# Patient Record
Sex: MALE | Race: WHITE | NOT HISPANIC OR LATINO
[De-identification: names, ages, dates, MRNs, and addresses within clinical notes are randomized per-mention and may not be internally consistent; named-entity substitution may affect disease eponyms.]

---

## 2021-06-13 ENCOUNTER — TRANSCRIPTION ENCOUNTER (OUTPATIENT)
Age: 66
End: 2021-06-13

## 2022-04-05 ENCOUNTER — TRANSCRIPTION ENCOUNTER (OUTPATIENT)
Age: 67
End: 2022-04-05

## 2022-11-15 ENCOUNTER — OFFICE (OUTPATIENT)
Dept: URBAN - METROPOLITAN AREA CLINIC 112 | Facility: CLINIC | Age: 67
Setting detail: OPHTHALMOLOGY
End: 2022-11-15
Payer: MEDICARE

## 2022-11-15 DIAGNOSIS — H16.222: ICD-10-CM

## 2022-11-15 DIAGNOSIS — H26.491: ICD-10-CM

## 2022-11-15 DIAGNOSIS — H16.223: ICD-10-CM

## 2022-11-15 DIAGNOSIS — H16.221: ICD-10-CM

## 2022-11-15 DIAGNOSIS — Z96.1: ICD-10-CM

## 2022-11-15 PROCEDURE — 83861 MICROFLUID ANALY TEARS: CPT | Performed by: OPHTHALMOLOGY

## 2022-11-15 PROCEDURE — 99213 OFFICE O/P EST LOW 20 MIN: CPT | Performed by: OPHTHALMOLOGY

## 2022-11-15 ASSESSMENT — REFRACTION_CURRENTRX
OS_CYLINDER: 0.00
OS_AXIS: 000
OD_VPRISM_DIRECTION: PROGS
OS_OVR_VA: 20/
OS_SPHERE: +1.00
OD_SPHERE: +1.00
OD_CYLINDER: 0.00
OD_OVR_VA: 20/
OS_VPRISM_DIRECTION: PROGS
OD_ADD: +2.00
OD_AXIS: 000
OS_ADD: +2.00

## 2022-11-15 ASSESSMENT — KERATOMETRY
OS_K2POWER_DIOPTERS: 45.00
OD_K1POWER_DIOPTERS: 44.50
OS_AXISANGLE_DEGREES: 090
OD_AXISANGLE_DEGREES: 090
OD_K2POWER_DIOPTERS: 44.50
OS_K1POWER_DIOPTERS: 44.50

## 2022-11-15 ASSESSMENT — REFRACTION_AUTOREFRACTION
OS_AXIS: 169
OD_SPHERE: 0.00
OS_SPHERE: 0.00
OD_CYLINDER: -0.50
OS_CYLINDER: -0.50
OD_AXIS: 067

## 2022-11-15 ASSESSMENT — REFRACTION_MANIFEST
OD_VA1: 20/20-2
OD_AXIS: 065
OS_VA1: 20/20
OS_VA2: 20/20
OD_SPHERE: PLANO
OS_SPHERE: PLANO
OD_AXIS: 080
OD_CYLINDER: -0.50
OD_VA1: 20/30-1
OD_VA2: 20/20
OD_SPHERE: +0.50
OD_CYLINDER: -0.75
OD_CYLINDER: -0.75
OD_VA1: 20/40
OD_SPHERE: +0.75
OD_AXIS: 073

## 2022-11-15 ASSESSMENT — AXIALLENGTH_DERIVED
OD_AL: 23.1831
OS_AL: 23.2356
OD_AL: 23.325
OD_AL: 23.0894

## 2022-11-15 ASSESSMENT — SPHEQUIV_DERIVED
OD_SPHEQUIV: 0.375
OD_SPHEQUIV: -0.25
OD_SPHEQUIV: 0.125
OS_SPHEQUIV: -0.25

## 2022-11-15 ASSESSMENT — CONFRONTATIONAL VISUAL FIELD TEST (CVF)
OS_FINDINGS: FULL
OD_FINDINGS: FULL

## 2022-11-15 ASSESSMENT — TEAR BREAK UP TIME (TBUT)
OD_TBUT: 6 SEC
OS_TBUT: 6 SEC

## 2022-11-15 ASSESSMENT — SUPERFICIAL PUNCTATE KERATITIS (SPK)
OD_SPK: 1+
OS_SPK: 1+

## 2022-11-15 ASSESSMENT — VISUAL ACUITY
OD_BCVA: 20/20
OS_BCVA: 20/30+1

## 2022-11-29 ENCOUNTER — RX ONLY (RX ONLY)
Age: 67
End: 2022-11-29

## 2022-11-29 ENCOUNTER — OFFICE (OUTPATIENT)
Dept: URBAN - METROPOLITAN AREA CLINIC 112 | Facility: CLINIC | Age: 67
Setting detail: OPHTHALMOLOGY
End: 2022-11-29
Payer: MEDICARE

## 2022-11-29 DIAGNOSIS — H26.492: ICD-10-CM

## 2022-11-29 DIAGNOSIS — H11.153: ICD-10-CM

## 2022-11-29 DIAGNOSIS — H02.132: ICD-10-CM

## 2022-11-29 DIAGNOSIS — H16.223: ICD-10-CM

## 2022-11-29 PROCEDURE — 99214 OFFICE O/P EST MOD 30 MIN: CPT | Performed by: OPHTHALMOLOGY

## 2022-11-29 ASSESSMENT — REFRACTION_AUTOREFRACTION
OD_AXIS: 072
OS_CYLINDER: -1.75
OS_AXIS: 002
OD_CYLINDER: -0.50
OS_SPHERE: -0.50
OD_SPHERE: 0.00

## 2022-11-29 ASSESSMENT — SPHEQUIV_DERIVED
OD_SPHEQUIV: -0.25
OD_SPHEQUIV: 0.375
OS_SPHEQUIV: -1.375
OD_SPHEQUIV: 0.125

## 2022-11-29 ASSESSMENT — REFRACTION_CURRENTRX
OD_ADD: +2.00
OD_VPRISM_DIRECTION: PROGS
OS_ADD: +2.00
OS_SPHERE: +1.00
OD_CYLINDER: 0.00
OD_OVR_VA: 20/
OD_SPHERE: +1.00
OS_OVR_VA: 20/
OS_VPRISM_DIRECTION: PROGS
OD_AXIS: 000
OS_AXIS: 000
OS_CYLINDER: 0.00

## 2022-11-29 ASSESSMENT — REFRACTION_MANIFEST
OD_VA1: 20/20-2
OS_VA2: 20/20
OD_SPHERE: +0.75
OD_CYLINDER: -0.75
OD_CYLINDER: -0.50
OD_AXIS: 065
OD_AXIS: 080
OD_AXIS: 073
OS_VA1: 20/20
OS_SPHERE: PLANO
OD_VA2: 20/20
OD_VA1: 20/30-1
OD_SPHERE: PLANO
OD_SPHERE: +0.50
OD_VA1: 20/40
OD_CYLINDER: -0.75

## 2022-11-29 ASSESSMENT — AXIALLENGTH_DERIVED
OD_AL: 23.0455
OD_AL: 23.1389
OS_AL: 23.3496
OD_AL: 23.2803

## 2022-11-29 ASSESSMENT — KERATOMETRY
OS_K2POWER_DIOPTERS: 46.25
OD_K2POWER_DIOPTERS: 44.75
OD_K1POWER_DIOPTERS: 44.50
OS_K1POWER_DIOPTERS: 45.00
OS_AXISANGLE_DEGREES: 096
OD_AXISANGLE_DEGREES: 119

## 2022-11-29 ASSESSMENT — VISUAL ACUITY
OD_BCVA: 20/20
OS_BCVA: 20/30-1

## 2022-11-29 ASSESSMENT — CONFRONTATIONAL VISUAL FIELD TEST (CVF)
OS_FINDINGS: FULL
OD_FINDINGS: FULL

## 2022-11-29 ASSESSMENT — TEAR BREAK UP TIME (TBUT)
OS_TBUT: 6 SEC
OD_TBUT: 6 SEC

## 2022-11-29 ASSESSMENT — LID POSITION - ECTROPION: OD_ECTROPION: RLL 1+ 2+

## 2022-11-29 ASSESSMENT — SUPERFICIAL PUNCTATE KERATITIS (SPK)
OS_SPK: 1+
OD_SPK: 1+

## 2022-12-23 ENCOUNTER — ASC (OUTPATIENT)
Dept: URBAN - METROPOLITAN AREA SURGERY 8 | Facility: SURGERY | Age: 67
Setting detail: OPHTHALMOLOGY
End: 2022-12-23
Payer: MEDICARE

## 2022-12-23 DIAGNOSIS — H26.492: ICD-10-CM

## 2022-12-23 PROBLEM — H26.491 POSTERIOR CAPSULAR OPACIFICATION; RIGHT EYE, LEFT EYE: Status: ACTIVE | Noted: 2022-12-23

## 2022-12-23 PROCEDURE — 66821 AFTER CATARACT LASER SURGERY: CPT | Performed by: OPHTHALMOLOGY

## 2022-12-23 ASSESSMENT — REFRACTION_MANIFEST
OD_CYLINDER: -0.75
OD_SPHERE: +0.75
OS_VA2: 20/20
OD_AXIS: 065
OD_VA2: 20/20
OD_VA1: 20/30-1
OD_AXIS: 080
OD_VA1: 20/20-2
OD_SPHERE: +0.50
OS_VA1: 20/20
OS_SPHERE: PLANO
OD_VA1: 20/40
OD_CYLINDER: -0.75
OD_SPHERE: PLANO
OD_CYLINDER: -0.50
OD_AXIS: 073

## 2022-12-23 ASSESSMENT — REFRACTION_AUTOREFRACTION
OD_AXIS: 072
OS_SPHERE: -0.50
OD_SPHERE: 0.00
OS_CYLINDER: -1.75
OS_AXIS: 002
OD_CYLINDER: -0.50

## 2022-12-23 ASSESSMENT — AXIALLENGTH_DERIVED
OD_AL: 23.2803
OD_AL: 23.1389
OD_AL: 23.0455
OS_AL: 23.3496

## 2022-12-23 ASSESSMENT — REFRACTION_CURRENTRX
OS_OVR_VA: 20/
OS_AXIS: 000
OD_OVR_VA: 20/
OS_VPRISM_DIRECTION: PROGS
OD_AXIS: 000
OD_SPHERE: +1.00
OS_SPHERE: +1.00
OD_ADD: +2.00
OD_VPRISM_DIRECTION: PROGS
OS_ADD: +2.00
OS_CYLINDER: 0.00
OD_CYLINDER: 0.00

## 2022-12-23 ASSESSMENT — SPHEQUIV_DERIVED
OD_SPHEQUIV: -0.25
OS_SPHEQUIV: -1.375
OD_SPHEQUIV: 0.375
OD_SPHEQUIV: 0.125

## 2022-12-23 ASSESSMENT — SUPERFICIAL PUNCTATE KERATITIS (SPK)
OD_SPK: 1+
OS_SPK: 1+

## 2022-12-23 ASSESSMENT — KERATOMETRY
OS_AXISANGLE_DEGREES: 096
OD_K1POWER_DIOPTERS: 44.50
OD_AXISANGLE_DEGREES: 119
OS_K2POWER_DIOPTERS: 46.25
OD_K2POWER_DIOPTERS: 44.75
OS_K1POWER_DIOPTERS: 45.00

## 2022-12-23 ASSESSMENT — VISUAL ACUITY
OS_BCVA: 20/30-1
OD_BCVA: 20/20

## 2022-12-23 ASSESSMENT — TEAR BREAK UP TIME (TBUT)
OD_TBUT: 6 SEC
OS_TBUT: 6 SEC

## 2023-01-12 ENCOUNTER — OFFICE (OUTPATIENT)
Dept: URBAN - METROPOLITAN AREA CLINIC 112 | Facility: CLINIC | Age: 68
Setting detail: OPHTHALMOLOGY
End: 2023-01-12
Payer: MEDICARE

## 2023-01-12 DIAGNOSIS — H26.492: ICD-10-CM

## 2023-01-12 DIAGNOSIS — H26.491: ICD-10-CM

## 2023-01-12 DIAGNOSIS — H02.132: ICD-10-CM

## 2023-01-12 DIAGNOSIS — Z96.1: ICD-10-CM

## 2023-01-12 DIAGNOSIS — H16.223: ICD-10-CM

## 2023-01-12 PROCEDURE — 99024 POSTOP FOLLOW-UP VISIT: CPT | Performed by: OPHTHALMOLOGY

## 2023-01-12 ASSESSMENT — REFRACTION_CURRENTRX
OS_SPHERE: +1.00
OS_VPRISM_DIRECTION: PROGS
OD_ADD: +2.00
OD_OVR_VA: 20/
OS_OVR_VA: 20/
OS_AXIS: 000
OS_CYLINDER: 0.00
OS_ADD: +2.00
OD_CYLINDER: 0.00
OD_VPRISM_DIRECTION: PROGS
OD_SPHERE: +1.00
OD_AXIS: 000

## 2023-01-12 ASSESSMENT — KERATOMETRY
OD_K1POWER_DIOPTERS: 44.25
OS_K2POWER_DIOPTERS: 45.50
OS_AXISANGLE_DEGREES: 095
OS_K1POWER_DIOPTERS: 44.75
OD_K2POWER_DIOPTERS: 44.75
OD_AXISANGLE_DEGREES: 144

## 2023-01-12 ASSESSMENT — REFRACTION_MANIFEST
OD_VA2: 20/20
OS_VA2: 20/20
OS_VA1: 20/20
OD_SPHERE: PLANO
OD_SPHERE: +0.50
OD_CYLINDER: -0.75
OD_AXIS: 080
OD_AXIS: 75
OD_VA1: 20/20-2
OD_CYLINDER: -0.50
OD_SPHERE: +0.75
OD_AXIS: 065
OS_SPHERE: PLANO
OD_CYLINDER: -0.75
OD_VA1: 20/30-1
OD_SPHERE: PLANO
OD_VA1: 20/40+
OD_CYLINDER: -0.75
OD_VA1: 20/40
OD_AXIS: 073

## 2023-01-12 ASSESSMENT — AXIALLENGTH_DERIVED
OD_AL: 23.0894
OS_AL: 23.1496
OD_AL: 23.2775
OD_AL: 23.1831

## 2023-01-12 ASSESSMENT — SPHEQUIV_DERIVED
OD_SPHEQUIV: 0.375
OS_SPHEQUIV: -0.375
OD_SPHEQUIV: 0.125
OD_SPHEQUIV: -0.125

## 2023-01-12 ASSESSMENT — REFRACTION_AUTOREFRACTION
OD_CYLINDER: -0.75
OS_AXIS: 178
OD_SPHERE: +0.25
OD_AXIS: 079
OS_SPHERE: 0.00
OS_CYLINDER: -0.75

## 2023-01-12 ASSESSMENT — CONFRONTATIONAL VISUAL FIELD TEST (CVF)
OD_FINDINGS: FULL
OS_FINDINGS: FULL

## 2023-01-12 ASSESSMENT — VISUAL ACUITY
OS_BCVA: 20/40+1
OD_BCVA: 20/20

## 2023-01-12 ASSESSMENT — LID POSITION - ECTROPION: OD_ECTROPION: RLL 1+ 2+

## 2023-01-12 ASSESSMENT — TEAR BREAK UP TIME (TBUT)
OD_TBUT: 6 SEC
OS_TBUT: 6 SEC

## 2023-01-12 ASSESSMENT — SUPERFICIAL PUNCTATE KERATITIS (SPK)
OD_SPK: 1+
OS_SPK: 1+

## 2023-02-05 ENCOUNTER — NON-APPOINTMENT (OUTPATIENT)
Age: 68
End: 2023-02-05

## 2023-02-28 ENCOUNTER — OFFICE (OUTPATIENT)
Dept: URBAN - METROPOLITAN AREA CLINIC 114 | Facility: CLINIC | Age: 68
Setting detail: OPHTHALMOLOGY
End: 2023-02-28
Payer: MEDICARE

## 2023-02-28 DIAGNOSIS — H02.135: ICD-10-CM

## 2023-02-28 DIAGNOSIS — H02.132: ICD-10-CM

## 2023-02-28 DIAGNOSIS — H43.393: ICD-10-CM

## 2023-02-28 DIAGNOSIS — H52.03: ICD-10-CM

## 2023-02-28 DIAGNOSIS — H16.223: ICD-10-CM

## 2023-02-28 PROBLEM — H16.222 DRY EYE SYNDROME K SICCA; RIGHT EYE, LEFT EYE, BOTH EYES: Status: ACTIVE | Noted: 2023-02-28

## 2023-02-28 PROBLEM — H16.221 DRY EYE SYNDROME K SICCA; RIGHT EYE, LEFT EYE, BOTH EYES: Status: ACTIVE | Noted: 2023-02-28

## 2023-02-28 PROCEDURE — 92015 DETERMINE REFRACTIVE STATE: CPT | Performed by: OPHTHALMOLOGY

## 2023-02-28 PROCEDURE — 99024 POSTOP FOLLOW-UP VISIT: CPT | Performed by: OPHTHALMOLOGY

## 2023-02-28 ASSESSMENT — REFRACTION_AUTOREFRACTION
OS_AXIS: 099
OD_SPHERE: 0.00
OS_SPHERE: +0.25
OD_AXIS: 076
OD_CYLINDER: -0.75
OS_CYLINDER: -0.75

## 2023-02-28 ASSESSMENT — REFRACTION_MANIFEST
OD_AXIS: 75
OD_VA1: 20/40
OU_VA: 20/20
OD_CYLINDER: -0.75
OD_SPHERE: +0.75
OD_VA1: 20/20-2
OD_CYLINDER: -0.50
OS_VA2: 20/20
OD_VA1: 20/30
OS_SPHERE: +0.25
OD_AXIS: 073
OS_CYLINDER: -0.50
OD_AXIS: 075
OS_VA1: 20/20
OD_AXIS: 065
OS_AXIS: 100
OD_VA1: 20/30-1
OD_SPHERE: PLANO
OS_SPHERE: PLANO
OD_VA2: 20/20
OD_SPHERE: PLANO
OD_AXIS: 080
OS_VA1: 20/20
OD_SPHERE: +0.50
OD_VA1: 20/40+
OD_SPHERE: PLANO
OD_CYLINDER: -0.75

## 2023-02-28 ASSESSMENT — SPHEQUIV_DERIVED
OD_SPHEQUIV: -0.375
OS_SPHEQUIV: 0
OD_SPHEQUIV: 0.375
OS_SPHEQUIV: -0.125
OD_SPHEQUIV: 0.125

## 2023-02-28 ASSESSMENT — VISUAL ACUITY
OD_BCVA: 20/20
OS_BCVA: 20/30-1

## 2023-02-28 ASSESSMENT — REFRACTION_CURRENTRX
OD_OVR_VA: 20/
OD_SPHERE: +1.00
OD_AXIS: 000
OS_CYLINDER: 0.00
OS_OVR_VA: 20/
OS_SPHERE: +1.00
OD_CYLINDER: 0.00
OD_ADD: +2.00
OS_AXIS: 000
OS_VPRISM_DIRECTION: PROGS
OD_VPRISM_DIRECTION: PROGS
OS_ADD: +2.00

## 2023-02-28 ASSESSMENT — TEAR BREAK UP TIME (TBUT)
OD_TBUT: 6 SEC
OS_TBUT: 6 SEC

## 2023-02-28 ASSESSMENT — SUPERFICIAL PUNCTATE KERATITIS (SPK)
OS_SPK: 1+
OD_SPK: 1+

## 2023-02-28 ASSESSMENT — LID POSITION - ECTROPION
OS_ECTROPION: LLL 1+
OD_ECTROPION: RLL 1+ 2+

## 2023-02-28 ASSESSMENT — CONFRONTATIONAL VISUAL FIELD TEST (CVF)
OD_FINDINGS: FULL
OS_FINDINGS: FULL

## 2023-03-10 ENCOUNTER — APPOINTMENT (OUTPATIENT)
Dept: ORTHOPEDIC SURGERY | Facility: CLINIC | Age: 68
End: 2023-03-10
Payer: MEDICARE

## 2023-03-10 DIAGNOSIS — Z78.9 OTHER SPECIFIED HEALTH STATUS: ICD-10-CM

## 2023-03-10 DIAGNOSIS — Z86.39 PERSONAL HISTORY OF OTHER ENDOCRINE, NUTRITIONAL AND METABOLIC DISEASE: ICD-10-CM

## 2023-03-10 DIAGNOSIS — M19.012 PRIMARY OSTEOARTHRITIS, LEFT SHOULDER: ICD-10-CM

## 2023-03-10 DIAGNOSIS — M75.22 BICIPITAL TENDINITIS, LEFT SHOULDER: ICD-10-CM

## 2023-03-10 DIAGNOSIS — E78.49 OTHER HYPERLIPIDEMIA: ICD-10-CM

## 2023-03-10 PROBLEM — Z00.00 ENCOUNTER FOR PREVENTIVE HEALTH EXAMINATION: Status: ACTIVE | Noted: 2023-03-10

## 2023-03-10 PROCEDURE — 99204 OFFICE O/P NEW MOD 45 MIN: CPT

## 2023-03-10 PROCEDURE — 73030 X-RAY EXAM OF SHOULDER: CPT | Mod: LT

## 2023-03-10 RX ORDER — LEVOTHYROXINE SODIUM 150 UG/1
150 TABLET ORAL
Refills: 0 | Status: ACTIVE | COMMUNITY

## 2023-03-10 RX ORDER — EZETIMIBE 10 MG/1
10 TABLET ORAL
Refills: 0 | Status: ACTIVE | COMMUNITY

## 2023-03-10 RX ORDER — PRAVASTATIN SODIUM 10 MG/1
10 TABLET ORAL
Refills: 0 | Status: ACTIVE | COMMUNITY

## 2023-03-10 RX ORDER — MELOXICAM 15 MG/1
15 TABLET ORAL
Qty: 90 | Refills: 0 | Status: ACTIVE | COMMUNITY
Start: 2023-03-10 | End: 1900-01-01

## 2023-03-10 NOTE — IMAGING
[de-identified] : (Shoulder Examination)\par \par Laterality\par -Left\par \par General\par -In no acute distress: yes\par -Alert and oriented to person, place and time: yes\par -Lymphadenopathy: no\par -Peripheral edema: no\par -Sensation grossly intact to light touch: yes\par -Capillary refill less than 2 seconds: yes \par \par Inspection\par -Skin intact: yes\par -Swelling: no\par -Atrophy: no\par -Scapular winging: no\par -AC deformity: no\par -Biceps deformity: no\par \par Tenderness to Palpation\par -Bicipital groove: yes\par -AC joint: no\par -Scapulothoracic: no\par -Cervical paraspinal: no\par \par Range of Motion\par -Active forward elevation: 170\par -Passive forward elevation: 170\par -External rotation at the side: 60\par -Internal rotation behind the back: T12\par -Cervical spine: normal\par \par Strength \par -Forward elevation: 5\par -External rotation at the side: 5\par -Internal rotation at the side: 5\par -Deltoid: 5\par \par Special Tests\par -Monterroso: positive \par -O’Aristeo’s: positive \par -Speed’s: positive \par -Cross body adduction: negative\par -Apprehension and relocation: negative\par -Sulcus sign: negative\par -Belly press: negative\par -Spurling’s: negative\par \par  [Left] : left shoulder [FreeTextEntry1] : mild GH OA with inferior humeral head osteophyte, central wear pattern, type 2 acromion

## 2023-03-10 NOTE — DISCUSSION/SUMMARY
[de-identified] : (Assessment and Plan)\par \par History, clinical examination and imaging were most consistent with:\par -left mild glenohumeral osteoarthritis and proximal biceps tendonitis\par \par The diagnosis was explained in detail. The potential non-surgical and surgical treatments were reviewed. The relative risks and benefits of each option were considered relative to the patient’s age, activity level, medical history, symptom severity and previously attempted treatments. \par \par -Non-surgical treatment was selected. \par -The added clinical utility of an MRI was discussed. The patient deferred further diagnostic testing at this time to pursue non-surgical treatment.\par -Patient will proceed with formal PT.\par -Meloxicam will be prescribed for symptom relief. GI precautions discussed. \par -Follow up as needed if symptoms persist or fail to improve. \par \par \par (MDM) \par \par Problem Complexity\par -Moderate: chronic illness with exacerbation\par \par Risk\par -Moderate: prescription medication\par \par -Patient has not been seen by another provider in my practice within the past 2 years who specializes in orthopedic sports medicine, shoulder or elbow surgery. \par \par The patient was advised to consult with their primary medical provider prior to initiation of any new medications to reduce the risk of adverse effects specific to their long-term home medications and medical history. The risk of gastrointestinal irritation and kidney injury specific to long-term NSAID use was discussed.   \par \par

## 2023-03-10 NOTE — HISTORY OF PRESENT ILLNESS
[de-identified] : (New Visit)\par \par Patient Details\par -Age: 67\par -Occupation: recycle management \par -Worker’s compensation claim: no\par -No-fault claim: no\par -School injury claim: no\par \par \par Symptom Details \par -Body part: LT shoulder \par -Duration of symptoms: 1 month \par -How symptoms began: NKI. history of SLAP repair in the early 2000s, no RCR. worse at night. very active, plays golf and works out\par -Location of pain: anterolateral\par -Quality of pain: dull\par -Pain score at rest: 4/10\par -Pain score with activity: 4/10\par -Swelling: no\par -Stiffness: no\par -Radicular symptoms (numbness or radiating pain down extremity): no\par \par \par Treatment Details\par -Activity modification: no\par -Medication: advil prn\par -Physical therapy: no\par -Home exercise program: works out 4x a week\par -Injection: no\par -MRI: not recent

## 2023-04-04 ENCOUNTER — OFFICE (OUTPATIENT)
Dept: URBAN - METROPOLITAN AREA CLINIC 100 | Facility: CLINIC | Age: 68
Setting detail: OPHTHALMOLOGY
End: 2023-04-04
Payer: MEDICARE

## 2023-04-04 DIAGNOSIS — H05.20: ICD-10-CM

## 2023-04-04 DIAGNOSIS — G90.2: ICD-10-CM

## 2023-04-04 DIAGNOSIS — H02.403: ICD-10-CM

## 2023-04-04 DIAGNOSIS — H16.223: ICD-10-CM

## 2023-04-04 DIAGNOSIS — H02.532: ICD-10-CM

## 2023-04-04 DIAGNOSIS — Z96.1: ICD-10-CM

## 2023-04-04 DIAGNOSIS — H02.535: ICD-10-CM

## 2023-04-04 PROCEDURE — 92285 EXTERNAL OCULAR PHOTOGRAPHY: CPT | Performed by: OPHTHALMOLOGY

## 2023-04-04 PROCEDURE — 99213 OFFICE O/P EST LOW 20 MIN: CPT | Performed by: OPHTHALMOLOGY

## 2023-04-04 ASSESSMENT — SPHEQUIV_DERIVED
OS_SPHEQUIV: -0.125
OD_SPHEQUIV: -0.375

## 2023-04-04 ASSESSMENT — LID POSITION - PTOSIS
OD_PTOSIS: RUL 1+
OS_PTOSIS: LLL 2+

## 2023-04-04 ASSESSMENT — VISUAL ACUITY
OS_BCVA: 20/50-2
OD_BCVA: 20/25-2

## 2023-04-04 ASSESSMENT — SUPERFICIAL PUNCTATE KERATITIS (SPK)
OS_SPK: 2+
OD_SPK: 2+

## 2023-04-04 ASSESSMENT — KERATOMETRY
OS_K2POWER_DIOPTERS: 43.00
OD_AXISANGLE_DEGREES: 176
OS_AXISANGLE_DEGREES: 175
OD_K1POWER_DIOPTERS: 42.50
OS_K1POWER_DIOPTERS: 41.00
OD_K2POWER_DIOPTERS: 43.75

## 2023-04-04 ASSESSMENT — TONOMETRY: OS_IOP_MMHG: 10

## 2023-04-04 ASSESSMENT — REFRACTION_AUTOREFRACTION
OS_CYLINDER: -0.75
OD_CYLINDER: -0.75
OS_AXIS: 099
OS_SPHERE: +0.25
OD_AXIS: 076
OD_SPHERE: 0.00

## 2023-04-04 ASSESSMENT — LID POSITION - COMMENTS
OS_COMMENTS: 2MM LOWER EYELID RETRACTION, PROMINENT GLOBES, NEGATIVE VECTOR
OD_COMMENTS: 2MM LOWER EYELID RETRACTION, PROMINENT GLOBES, NEGATIVE VECTOR

## 2023-04-04 ASSESSMENT — LID POSITION - ECTROPION
OS_ECTROPION: ABSENT
OD_ECTROPION: ABSENT

## 2023-04-04 ASSESSMENT — TEAR BREAK UP TIME (TBUT)
OS_TBUT: 6 SEC
OD_TBUT: 6 SEC

## 2023-04-04 ASSESSMENT — CONFRONTATIONAL VISUAL FIELD TEST (CVF)
OS_FINDINGS: FULL
OD_FINDINGS: FULL

## 2023-04-04 ASSESSMENT — AXIALLENGTH_DERIVED
OD_AL: 23.8792
OS_AL: 24.2072

## 2023-04-27 ENCOUNTER — RX ONLY (RX ONLY)
Age: 68
End: 2023-04-27

## 2023-04-27 ENCOUNTER — OFFICE (OUTPATIENT)
Dept: URBAN - METROPOLITAN AREA CLINIC 112 | Facility: CLINIC | Age: 68
Setting detail: OPHTHALMOLOGY
End: 2023-04-27
Payer: MEDICARE

## 2023-04-27 DIAGNOSIS — H11.153: ICD-10-CM

## 2023-04-27 DIAGNOSIS — H16.223: ICD-10-CM

## 2023-04-27 DIAGNOSIS — H43.393: ICD-10-CM

## 2023-04-27 PROBLEM — H02.532 EYELID RETRACTION; RIGHT LOWER LID, LEFT LOWER LID: Status: ACTIVE | Noted: 2023-04-04

## 2023-04-27 PROBLEM — H05.20 EXOPHTHALMOS UNSPECIFIED: Status: ACTIVE | Noted: 2023-04-04

## 2023-04-27 PROBLEM — H02.403 PTOSIS OF EYELID, UNSPECIFIED; BOTH EYES: Status: ACTIVE | Noted: 2023-04-04

## 2023-04-27 PROBLEM — G90.2 HORNER'S SYNDROME: Status: ACTIVE | Noted: 2023-04-04

## 2023-04-27 PROBLEM — H02.535 EYELID RETRACTION; RIGHT LOWER LID, LEFT LOWER LID: Status: ACTIVE | Noted: 2023-04-04

## 2023-04-27 PROCEDURE — 92012 INTRM OPH EXAM EST PATIENT: CPT | Performed by: OPHTHALMOLOGY

## 2023-04-27 PROCEDURE — 68761 CLOSE TEAR DUCT OPENING: CPT | Performed by: OPHTHALMOLOGY

## 2023-04-27 ASSESSMENT — REFRACTION_AUTOREFRACTION
OS_SPHERE: -0.50
OS_AXIS: 172
OS_CYLINDER: -0.75
OD_SPHERE: -0.50
OD_AXIS: 082
OD_CYLINDER: -0.25

## 2023-04-27 ASSESSMENT — LID POSITION - PTOSIS
OD_PTOSIS: RUL 1+
OS_PTOSIS: LLL 2+

## 2023-04-27 ASSESSMENT — TEAR BREAK UP TIME (TBUT)
OS_TBUT: 6 SEC
OD_TBUT: 6 SEC

## 2023-04-27 ASSESSMENT — REFRACTION_MANIFEST
OD_AXIS: 082
OS_VA1: 20/20
OD_CYLINDER: 0.00
OS_CYLINDER: 0.00
OS_SPHERE: PLANO
OD_VA1: 20/20
OD_SPHERE: -0.50
OS_AXIS: 000

## 2023-04-27 ASSESSMENT — SPHEQUIV_DERIVED
OS_SPHEQUIV: -0.875
OD_SPHEQUIV: -0.5
OD_SPHEQUIV: -0.625

## 2023-04-27 ASSESSMENT — LID POSITION - ECTROPION
OS_ECTROPION: ABSENT
OD_ECTROPION: ABSENT

## 2023-04-27 ASSESSMENT — AXIALLENGTH_DERIVED
OD_AL: 23.466
OD_AL: 23.5143
OS_AL: 23.1162

## 2023-04-27 ASSESSMENT — VISUAL ACUITY
OD_BCVA: 20/20-1
OS_BCVA: 20/40-1

## 2023-04-27 ASSESSMENT — KERATOMETRY
OD_K2POWER_DIOPTERS: 44.50
OD_AXISANGLE_DEGREES: 128
OS_K2POWER_DIOPTERS: 46.25
OD_K1POWER_DIOPTERS: 44.25
OS_AXISANGLE_DEGREES: 096
OS_K1POWER_DIOPTERS: 45.25

## 2023-04-27 ASSESSMENT — CONFRONTATIONAL VISUAL FIELD TEST (CVF)
OS_FINDINGS: FULL
OD_FINDINGS: FULL

## 2023-04-27 ASSESSMENT — SUPERFICIAL PUNCTATE KERATITIS (SPK)
OD_SPK: 2+
OS_SPK: 2+

## 2023-05-30 ENCOUNTER — OFFICE (OUTPATIENT)
Dept: URBAN - METROPOLITAN AREA CLINIC 112 | Facility: CLINIC | Age: 68
Setting detail: OPHTHALMOLOGY
End: 2023-05-30
Payer: MEDICARE

## 2023-05-30 DIAGNOSIS — H16.222: ICD-10-CM

## 2023-05-30 DIAGNOSIS — H16.223: ICD-10-CM

## 2023-05-30 DIAGNOSIS — H16.221: ICD-10-CM

## 2023-05-30 DIAGNOSIS — H43.393: ICD-10-CM

## 2023-05-30 DIAGNOSIS — H11.153: ICD-10-CM

## 2023-05-30 PROCEDURE — 83861 MICROFLUID ANALY TEARS: CPT | Performed by: OPHTHALMOLOGY

## 2023-05-30 PROCEDURE — 68761 CLOSE TEAR DUCT OPENING: CPT | Performed by: OPHTHALMOLOGY

## 2023-05-30 PROCEDURE — 99213 OFFICE O/P EST LOW 20 MIN: CPT | Performed by: OPHTHALMOLOGY

## 2023-05-30 ASSESSMENT — AXIALLENGTH_DERIVED
OD_AL: 23.5572
OS_AL: 23.1269
OD_AL: 23.3169

## 2023-05-30 ASSESSMENT — VISUAL ACUITY
OS_BCVA: 20/25
OD_BCVA: 20/20

## 2023-05-30 ASSESSMENT — REFRACTION_AUTOREFRACTION
OS_AXIS: 176
OD_SPHERE: +0.50
OS_CYLINDER: -2.75
OS_SPHERE: 0.00
OD_AXIS: 052
OD_CYLINDER: -0.75

## 2023-05-30 ASSESSMENT — CONFRONTATIONAL VISUAL FIELD TEST (CVF)
OD_FINDINGS: FULL
OS_FINDINGS: FULL

## 2023-05-30 ASSESSMENT — SPHEQUIV_DERIVED
OD_SPHEQUIV: 0.125
OS_SPHEQUIV: -1.375
OD_SPHEQUIV: -0.5

## 2023-05-30 ASSESSMENT — REFRACTION_MANIFEST
OD_VA1: 20/20
OD_SPHERE: PLANO
OS_SPHERE: PLANO
OD_AXIS: 082
OD_CYLINDER: 0.00
OS_VA1: 20/20
OS_SPHERE: PLANO
OD_SPHERE: -0.50
OD_VA1: 20/25
OS_AXIS: 000
OS_VA1: 20/20
OS_CYLINDER: 0.00

## 2023-05-30 ASSESSMENT — KERATOMETRY
OD_AXISANGLE_DEGREES: 127
OS_K2POWER_DIOPTERS: 47.75
OS_AXISANGLE_DEGREES: 089
OS_K1POWER_DIOPTERS: 44.75
OD_K2POWER_DIOPTERS: 44.50
OD_K1POWER_DIOPTERS: 43.75

## 2023-05-30 ASSESSMENT — TEAR BREAK UP TIME (TBUT)
OS_TBUT: 2+
OD_TBUT: 1+

## 2023-05-30 ASSESSMENT — SUPERFICIAL PUNCTATE KERATITIS (SPK)
OD_SPK: 2+
OS_SPK: 2+

## 2023-05-30 ASSESSMENT — LID POSITION - ECTROPION
OS_ECTROPION: ABSENT
OD_ECTROPION: ABSENT

## 2023-05-30 ASSESSMENT — TONOMETRY: OD_IOP_MMHG: 14

## 2023-05-30 ASSESSMENT — LID POSITION - PTOSIS
OD_PTOSIS: RUL 1+
OS_PTOSIS: LLL 2+

## 2023-07-18 ENCOUNTER — RX ONLY (RX ONLY)
Age: 68
End: 2023-07-18

## 2023-07-18 ENCOUNTER — OFFICE (OUTPATIENT)
Dept: URBAN - METROPOLITAN AREA CLINIC 112 | Facility: CLINIC | Age: 68
Setting detail: OPHTHALMOLOGY
End: 2023-07-18
Payer: MEDICARE

## 2023-07-18 DIAGNOSIS — H02.532: ICD-10-CM

## 2023-07-18 DIAGNOSIS — H16.222: ICD-10-CM

## 2023-07-18 DIAGNOSIS — H16.221: ICD-10-CM

## 2023-07-18 DIAGNOSIS — H02.535: ICD-10-CM

## 2023-07-18 DIAGNOSIS — H16.223: ICD-10-CM

## 2023-07-18 PROCEDURE — 68761 CLOSE TEAR DUCT OPENING: CPT | Performed by: OPHTHALMOLOGY

## 2023-07-18 PROCEDURE — 92012 INTRM OPH EXAM EST PATIENT: CPT | Performed by: OPHTHALMOLOGY

## 2023-07-18 PROCEDURE — 83861 MICROFLUID ANALY TEARS: CPT | Performed by: OPHTHALMOLOGY

## 2023-07-18 ASSESSMENT — AXIALLENGTH_DERIVED
OD_AL: 23.2275
OD_AL: 23.466
OS_AL: 23.1469

## 2023-07-18 ASSESSMENT — TEAR BREAK UP TIME (TBUT)
OD_TBUT: 1+
OS_TBUT: 2+

## 2023-07-18 ASSESSMENT — KERATOMETRY
OS_K1POWER_DIOPTERS: 45.00
OD_K1POWER_DIOPTERS: 44.25
OS_AXISANGLE_DEGREES: 090
OD_AXISANGLE_DEGREES: 150
OD_K2POWER_DIOPTERS: 44.50
OS_K2POWER_DIOPTERS: 45.00

## 2023-07-18 ASSESSMENT — CONFRONTATIONAL VISUAL FIELD TEST (CVF)
OD_FINDINGS: FULL
OS_FINDINGS: FULL

## 2023-07-18 ASSESSMENT — REFRACTION_AUTOREFRACTION
OS_CYLINDER: -0.50
OS_SPHERE: 0.00
OS_AXIS: 146
OD_AXIS: 074
OD_SPHERE: +0.75
OD_CYLINDER: -1.25

## 2023-07-18 ASSESSMENT — SUPERFICIAL PUNCTATE KERATITIS (SPK)
OS_SPK: 2+
OD_SPK: 2+

## 2023-07-18 ASSESSMENT — VISUAL ACUITY
OD_BCVA: 20/25
OS_BCVA: 20/40+1

## 2023-07-18 ASSESSMENT — LID POSITION - PTOSIS
OD_PTOSIS: RUL 1+
OS_PTOSIS: LLL 2+

## 2023-07-18 ASSESSMENT — REFRACTION_MANIFEST
OS_VA1: 20/20
OD_SPHERE: -0.50
OD_VA1: 20/25
OD_AXIS: 082
OD_VA1: 20/20
OS_CYLINDER: 0.00
OS_SPHERE: PLANO
OD_CYLINDER: 0.00
OS_VA1: 20/20
OS_SPHERE: PLANO
OS_AXIS: 000
OD_SPHERE: PLANO

## 2023-07-18 ASSESSMENT — SPHEQUIV_DERIVED
OD_SPHEQUIV: 0.125
OS_SPHEQUIV: -0.25
OD_SPHEQUIV: -0.5

## 2023-07-18 ASSESSMENT — LID POSITION - ECTROPION
OS_ECTROPION: ABSENT
OD_ECTROPION: ABSENT

## 2023-07-25 ENCOUNTER — OFFICE (OUTPATIENT)
Dept: URBAN - METROPOLITAN AREA CLINIC 112 | Facility: CLINIC | Age: 68
Setting detail: OPHTHALMOLOGY
End: 2023-07-25
Payer: MEDICARE

## 2023-07-25 DIAGNOSIS — H02.423: ICD-10-CM

## 2023-07-25 DIAGNOSIS — H11.153: ICD-10-CM

## 2023-07-25 DIAGNOSIS — H02.132: ICD-10-CM

## 2023-07-25 DIAGNOSIS — H02.135: ICD-10-CM

## 2023-07-25 DIAGNOSIS — H02.532: ICD-10-CM

## 2023-07-25 DIAGNOSIS — H05.20: ICD-10-CM

## 2023-07-25 DIAGNOSIS — H16.222: ICD-10-CM

## 2023-07-25 DIAGNOSIS — H02.403: ICD-10-CM

## 2023-07-25 DIAGNOSIS — H02.535: ICD-10-CM

## 2023-07-25 DIAGNOSIS — H16.221: ICD-10-CM

## 2023-07-25 PROCEDURE — 83861 MICROFLUID ANALY TEARS: CPT | Performed by: OPHTHALMOLOGY

## 2023-07-25 PROCEDURE — 99213 OFFICE O/P EST LOW 20 MIN: CPT | Performed by: OPHTHALMOLOGY

## 2023-07-25 ASSESSMENT — SPHEQUIV_DERIVED
OD_SPHEQUIV: -0.5
OD_SPHEQUIV: 0.125
OS_SPHEQUIV: -0.25

## 2023-07-25 ASSESSMENT — REFRACTION_MANIFEST
OD_SPHERE: -0.50
OD_VA1: 20/25
OD_CYLINDER: 0.00
OS_VA1: 20/20
OD_SPHERE: PLANO
OD_AXIS: 082
OS_VA1: 20/20
OS_CYLINDER: 0.00
OS_AXIS: 000
OD_VA1: 20/20
OS_SPHERE: PLANO
OS_SPHERE: PLANO

## 2023-07-25 ASSESSMENT — KERATOMETRY
OD_K2POWER_DIOPTERS: 44.50
OD_AXISANGLE_DEGREES: 150
OD_K1POWER_DIOPTERS: 44.25
OS_AXISANGLE_DEGREES: 090
OS_K1POWER_DIOPTERS: 45.00
OS_K2POWER_DIOPTERS: 45.00

## 2023-07-25 ASSESSMENT — VISUAL ACUITY
OD_BCVA: 20/20-1
OS_BCVA: 20/30

## 2023-07-25 ASSESSMENT — LID POSITION - ECTROPION
OD_ECTROPION: RLL T
OS_ECTROPION: LLL T

## 2023-07-25 ASSESSMENT — REFRACTION_AUTOREFRACTION
OS_SPHERE: 0.00
OS_AXIS: 146
OD_CYLINDER: -1.25
OS_CYLINDER: -0.50
OD_AXIS: 074
OD_SPHERE: +0.75

## 2023-07-25 ASSESSMENT — AXIALLENGTH_DERIVED
OD_AL: 23.2275
OD_AL: 23.466
OS_AL: 23.1469

## 2023-07-25 ASSESSMENT — CONFRONTATIONAL VISUAL FIELD TEST (CVF)
OS_FINDINGS: FULL
OD_FINDINGS: FULL

## 2023-07-25 ASSESSMENT — TEAR BREAK UP TIME (TBUT)
OS_TBUT: 2+
OD_TBUT: 1+

## 2023-07-25 ASSESSMENT — SUPERFICIAL PUNCTATE KERATITIS (SPK)
OS_SPK: 2+
OD_SPK: 2+

## 2023-07-25 ASSESSMENT — TONOMETRY: OD_IOP_MMHG: 10

## 2023-07-25 ASSESSMENT — LID POSITION - PTOSIS
OS_PTOSIS: LLL 2+
OD_PTOSIS: RUL 1+

## 2023-09-07 ENCOUNTER — OFFICE (OUTPATIENT)
Dept: URBAN - METROPOLITAN AREA CLINIC 103 | Facility: CLINIC | Age: 68
Setting detail: OPHTHALMOLOGY
End: 2023-09-07
Payer: MEDICARE

## 2023-09-07 DIAGNOSIS — H02.535: ICD-10-CM

## 2023-09-07 DIAGNOSIS — H16.222: ICD-10-CM

## 2023-09-07 DIAGNOSIS — H02.532: ICD-10-CM

## 2023-09-07 DIAGNOSIS — H02.132: ICD-10-CM

## 2023-09-07 DIAGNOSIS — H16.221: ICD-10-CM

## 2023-09-07 DIAGNOSIS — H02.135: ICD-10-CM

## 2023-09-07 DIAGNOSIS — H11.153: ICD-10-CM

## 2023-09-07 DIAGNOSIS — H05.20: ICD-10-CM

## 2023-09-07 DIAGNOSIS — H02.403: ICD-10-CM

## 2023-09-07 PROCEDURE — 92012 INTRM OPH EXAM EST PATIENT: CPT | Performed by: OPHTHALMOLOGY

## 2023-09-07 ASSESSMENT — KERATOMETRY
OD_K2POWER_DIOPTERS: 44.50
OS_K1POWER_DIOPTERS: 45.00
OS_AXISANGLE_DEGREES: 090
OD_K1POWER_DIOPTERS: 44.25
OD_AXISANGLE_DEGREES: 150
OS_K2POWER_DIOPTERS: 45.00

## 2023-09-07 ASSESSMENT — VISUAL ACUITY
OS_BCVA: 20/30-1
OD_BCVA: 20/30

## 2023-09-07 ASSESSMENT — LID POSITION - PTOSIS
OD_PTOSIS: RUL 1+
OS_PTOSIS: LLL 2+

## 2023-09-07 ASSESSMENT — REFRACTION_MANIFEST
OD_SPHERE: -0.50
OD_VA1: 20/20
OD_CYLINDER: 0.00
OS_SPHERE: PLANO
OS_SPHERE: PLANO
OS_VA1: 20/20
OD_SPHERE: PLANO
OD_VA1: 20/25
OS_CYLINDER: 0.00
OD_AXIS: 082
OS_VA1: 20/20
OS_AXIS: 000

## 2023-09-07 ASSESSMENT — REFRACTION_AUTOREFRACTION
OD_CYLINDER: -1.25
OS_SPHERE: 0.00
OS_AXIS: 146
OD_AXIS: 074
OD_SPHERE: +0.75
OS_CYLINDER: -0.50

## 2023-09-07 ASSESSMENT — LID POSITION - ECTROPION
OD_ECTROPION: RLL T
OS_ECTROPION: LLL T

## 2023-09-07 ASSESSMENT — CONFRONTATIONAL VISUAL FIELD TEST (CVF)
OS_FINDINGS: FULL
OD_FINDINGS: FULL

## 2023-09-07 ASSESSMENT — AXIALLENGTH_DERIVED
OD_AL: 23.2275
OD_AL: 23.466
OS_AL: 23.1469

## 2023-09-07 ASSESSMENT — LID POSITION - COMMENTS
OD_COMMENTS: 2MM LOWER EYELID RETRACTION, PROMINENT GLOBES, NEGATIVE VECTOR
OS_COMMENTS: 2MM LOWER EYELID RETRACTION, PROMINENT GLOBES, NEGATIVE VECTOR

## 2023-09-07 ASSESSMENT — SUPERFICIAL PUNCTATE KERATITIS (SPK)
OS_SPK: 2+
OD_SPK: 2+

## 2023-09-07 ASSESSMENT — SPHEQUIV_DERIVED
OD_SPHEQUIV: 0.125
OD_SPHEQUIV: -0.5
OS_SPHEQUIV: -0.25

## 2023-09-07 ASSESSMENT — TEAR BREAK UP TIME (TBUT)
OS_TBUT: 2+
OD_TBUT: 1+

## 2023-11-02 ENCOUNTER — RX ONLY (RX ONLY)
Age: 68
End: 2023-11-02

## 2023-11-02 ENCOUNTER — OFFICE (OUTPATIENT)
Dept: URBAN - METROPOLITAN AREA CLINIC 103 | Facility: CLINIC | Age: 68
Setting detail: OPHTHALMOLOGY
End: 2023-11-02
Payer: MEDICARE

## 2023-11-02 DIAGNOSIS — H02.132: ICD-10-CM

## 2023-11-02 DIAGNOSIS — H02.135: ICD-10-CM

## 2023-11-02 PROCEDURE — 92012 INTRM OPH EXAM EST PATIENT: CPT | Performed by: OPHTHALMOLOGY

## 2023-11-02 ASSESSMENT — SPHEQUIV_DERIVED
OD_SPHEQUIV: 0.125
OS_SPHEQUIV: -0.25
OD_SPHEQUIV: -0.5

## 2023-11-02 ASSESSMENT — REFRACTION_MANIFEST
OS_VA1: 20/20
OD_VA1: 20/20
OS_CYLINDER: 0.00
OS_VA1: 20/20
OS_SPHERE: PLANO
OS_SPHERE: PLANO
OS_AXIS: 000
OD_SPHERE: -0.50
OD_SPHERE: PLANO
OD_VA1: 20/25
OD_AXIS: 082
OD_CYLINDER: 0.00

## 2023-11-02 ASSESSMENT — CONFRONTATIONAL VISUAL FIELD TEST (CVF)
OS_FINDINGS: FULL
OD_FINDINGS: FULL

## 2023-11-02 ASSESSMENT — SUPERFICIAL PUNCTATE KERATITIS (SPK)
OS_SPK: 2+
OD_SPK: 2+

## 2023-11-02 ASSESSMENT — LID POSITION - ECTROPION
OD_ECTROPION: RLL T
OS_ECTROPION: LLL T

## 2023-11-02 ASSESSMENT — TEAR BREAK UP TIME (TBUT)
OS_TBUT: 2+
OD_TBUT: 1+

## 2023-11-02 ASSESSMENT — REFRACTION_AUTOREFRACTION
OS_AXIS: 146
OS_CYLINDER: -0.50
OS_SPHERE: 0.00
OD_CYLINDER: -1.25
OD_AXIS: 074
OD_SPHERE: +0.75

## 2023-11-02 ASSESSMENT — LID POSITION - PTOSIS
OS_PTOSIS: LLL 2+
OD_PTOSIS: RUL 1+

## 2023-12-05 ENCOUNTER — OFFICE (OUTPATIENT)
Dept: URBAN - METROPOLITAN AREA CLINIC 112 | Facility: CLINIC | Age: 68
Setting detail: OPHTHALMOLOGY
End: 2023-12-05
Payer: MEDICARE

## 2023-12-05 DIAGNOSIS — H11.153: ICD-10-CM

## 2023-12-05 DIAGNOSIS — H02.135: ICD-10-CM

## 2023-12-05 DIAGNOSIS — H16.221: ICD-10-CM

## 2023-12-05 DIAGNOSIS — H02.132: ICD-10-CM

## 2023-12-05 DIAGNOSIS — H16.222: ICD-10-CM

## 2023-12-05 PROCEDURE — 83861 MICROFLUID ANALY TEARS: CPT | Mod: QW,RT | Performed by: OPHTHALMOLOGY

## 2023-12-05 PROCEDURE — 99213 OFFICE O/P EST LOW 20 MIN: CPT | Performed by: OPHTHALMOLOGY

## 2023-12-05 PROCEDURE — 83861 MICROFLUID ANALY TEARS: CPT | Mod: QW,LT | Performed by: OPHTHALMOLOGY

## 2023-12-05 ASSESSMENT — SPHEQUIV_DERIVED
OS_SPHEQUIV: 0.375
OD_SPHEQUIV: -0.5
OD_SPHEQUIV: 0.25

## 2023-12-05 ASSESSMENT — REFRACTION_MANIFEST
OS_SPHERE: PLANO
OD_SPHERE: -0.50
OD_CYLINDER: 0.00
OS_AXIS: 000
OD_VA1: 20/20
OS_VA1: 20/20
OD_SPHERE: PLANO
OS_VA1: 20/20
OS_SPHERE: PLANO
OD_VA1: 20/25
OD_AXIS: 082
OS_CYLINDER: 0.00

## 2023-12-05 ASSESSMENT — REFRACTION_AUTOREFRACTION
OS_CYLINDER: -0.75
OD_SPHERE: +0.75
OS_AXIS: 105
OS_SPHERE: +0.75
OD_CYLINDER: -1.00
OD_AXIS: 097

## 2023-12-05 ASSESSMENT — LID POSITION - PTOSIS
OS_PTOSIS: LLL 2+
OD_PTOSIS: RUL 1+

## 2023-12-05 ASSESSMENT — CONFRONTATIONAL VISUAL FIELD TEST (CVF)
OS_FINDINGS: FULL
OD_FINDINGS: FULL

## 2023-12-05 ASSESSMENT — LID POSITION - ECTROPION
OD_ECTROPION: RLL T
OS_ECTROPION: LLL T

## 2023-12-05 ASSESSMENT — TEAR BREAK UP TIME (TBUT)
OD_TBUT: 1+
OS_TBUT: 2+

## 2023-12-05 ASSESSMENT — SUPERFICIAL PUNCTATE KERATITIS (SPK)
OD_SPK: 2+
OS_SPK: 2+

## 2024-02-01 ENCOUNTER — OFFICE (OUTPATIENT)
Dept: URBAN - METROPOLITAN AREA CLINIC 103 | Facility: CLINIC | Age: 69
Setting detail: OPHTHALMOLOGY
End: 2024-02-01
Payer: MEDICARE

## 2024-02-01 DIAGNOSIS — H02.132: ICD-10-CM

## 2024-02-01 DIAGNOSIS — H02.135: ICD-10-CM

## 2024-02-01 DIAGNOSIS — H16.223: ICD-10-CM

## 2024-02-01 PROCEDURE — 92285 EXTERNAL OCULAR PHOTOGRAPHY: CPT | Performed by: OPHTHALMOLOGY

## 2024-02-01 PROCEDURE — 92012 INTRM OPH EXAM EST PATIENT: CPT | Performed by: OPHTHALMOLOGY

## 2024-02-01 ASSESSMENT — REFRACTION_MANIFEST
OS_SPHERE: PLANO
OS_VA1: 20/20
OD_AXIS: 082
OD_CYLINDER: 0.00
OD_SPHERE: PLANO
OD_VA1: 20/20
OD_VA1: 20/25
OS_SPHERE: PLANO
OS_VA1: 20/20
OS_CYLINDER: 0.00
OD_SPHERE: -0.50
OS_AXIS: 000

## 2024-02-01 ASSESSMENT — LID POSITION - ECTROPION
OS_ECTROPION: LLL T
OD_ECTROPION: RLL T

## 2024-02-01 ASSESSMENT — SUPERFICIAL PUNCTATE KERATITIS (SPK)
OS_SPK: 2+
OD_SPK: 2+

## 2024-02-01 ASSESSMENT — REFRACTION_AUTOREFRACTION
OS_SPHERE: +0.75
OS_AXIS: 105
OD_SPHERE: +0.75
OD_CYLINDER: -1.00
OD_AXIS: 097
OS_CYLINDER: -0.75

## 2024-02-01 ASSESSMENT — SPHEQUIV_DERIVED
OD_SPHEQUIV: -0.5
OD_SPHEQUIV: 0.25
OS_SPHEQUIV: 0.375

## 2024-02-01 ASSESSMENT — LID POSITION - PTOSIS
OD_PTOSIS: RUL 1+
OS_PTOSIS: LLL 2+

## 2024-02-01 ASSESSMENT — TEAR BREAK UP TIME (TBUT)
OD_TBUT: 1+
OS_TBUT: 2+

## 2024-04-05 ENCOUNTER — APPOINTMENT (OUTPATIENT)
Dept: ORTHOPEDIC SURGERY | Facility: CLINIC | Age: 69
End: 2024-04-05
Payer: MEDICARE

## 2024-04-05 PROCEDURE — 99214 OFFICE O/P EST MOD 30 MIN: CPT

## 2024-04-05 PROCEDURE — 73030 X-RAY EXAM OF SHOULDER: CPT | Mod: RT

## 2024-04-05 RX ORDER — MELOXICAM 15 MG/1
15 TABLET ORAL DAILY
Qty: 30 | Refills: 2 | Status: ACTIVE | COMMUNITY
Start: 2024-04-05 | End: 1900-01-01

## 2024-04-05 NOTE — DISCUSSION/SUMMARY
[de-identified] : History, clinical examination and imaging were most consistent with: -right shoulder rotator cuff partial tear   The diagnosis was explained in detail. The potential non-surgical and surgical treatments were reviewed. The relative risks and benefits of each option were considered relative to the patients age, activity level, medical history, symptom severity and previously attempted treatments.   The patient was advised to consult with their primary medical provider prior to initiation of any new medications to reduce the risk of adverse effects specific to their long-term home medications and medical history. The risk of gastrointestinal irritation and kidney injury specific to long-term NSAID use was discussed.   -Patient will proceed with formal PT. -Cortisone injection is discussed and deferred by the patient at this time. -Meloxicam as needed for pain. -The added clinical utility of an MRI was discussed. The patient deferred further diagnostic testing at this time. -Follow up in 6 weeks. If symptoms persist consider MRI vs CSI.    (MDM)   Problem Complexity -Moderate: chronic illness with exacerbation   Risk -Moderate: prescription medication

## 2024-04-05 NOTE — IMAGING
[de-identified] : (Exam: Shoulder)   Laterality is right    Patient is in no acute distress, alert and oriented Sensation is grossly intact to light touch in the hand Motor function is 5/5 in the hand Capillary refill is less than 2 seconds in the fingers Lymphadenopathy is not present Peripheral edema is not present   Skin is intact Swelling is not present Atrophy is not present Scapular winging is not present Deformity of the AC joint is not present Deformity of the biceps is not present   Bicipital groove tenderness is present AC joint tenderness is not present Scapulothoracic tenderness is not present   Active forward elevation is 160 Passive forward elevation is 175 External rotation at the side is 60 Internal rotation behind the back is to the level of T12   Forward elevation strength is 5-/5 External rotation strength at the side is 5/5 Internal rotation strength at the side is 5/5 Deltoid strength of anterior, posterior and lateral heads is 5/5   Monterroso test is abnormal OBriens test is abnormal Empty can test is abnormal Speeds test is abnormal Cross body adduction test is normal Belly press test is normal Apprehension and relocation is normal Sulcus sign is normal    [Right] : right shoulder [There are no fractures, subluxations or dislocations. No significant abnormalities are seen] : There are no fractures, subluxations or dislocations. No significant abnormalities are seen [Type 2 acromion] : Type 2 acromion

## 2024-04-05 NOTE — HISTORY OF PRESENT ILLNESS
[de-identified] : Date of evaluation 04/05/2024 Patient age in years is 68 Occupation is executive   Body part causing symptoms is the right shoulder Symptoms began NKI 2-3 months ago Pain during golf and the gym Location of pain is medially and anteriorly  Quality of pain is sharp  Pain score at rest is0/10 Pain score during activity is7/10 Radicular symptoms are not present  Prior treatments include Advil and Tylenol PRN  Patient's condition is not associated with workers compensation, no-fault or interscholastic athletics Patient had right shoulder scope about 10 years ago by Dr. Smith  Previously seen for LT shoulder which improved with PT

## 2024-04-30 ENCOUNTER — OFFICE (OUTPATIENT)
Dept: URBAN - METROPOLITAN AREA CLINIC 112 | Facility: CLINIC | Age: 69
Setting detail: OPHTHALMOLOGY
End: 2024-04-30
Payer: MEDICARE

## 2024-04-30 DIAGNOSIS — H11.153: ICD-10-CM

## 2024-04-30 DIAGNOSIS — H43.393: ICD-10-CM

## 2024-04-30 DIAGNOSIS — H16.222: ICD-10-CM

## 2024-04-30 DIAGNOSIS — H16.221: ICD-10-CM

## 2024-04-30 PROCEDURE — 83861 MICROFLUID ANALY TEARS: CPT | Mod: QW,RT | Performed by: OPHTHALMOLOGY

## 2024-04-30 PROCEDURE — 83861 MICROFLUID ANALY TEARS: CPT | Mod: QW,LT | Performed by: OPHTHALMOLOGY

## 2024-04-30 PROCEDURE — 92014 COMPRE OPH EXAM EST PT 1/>: CPT | Performed by: OPHTHALMOLOGY

## 2024-04-30 PROCEDURE — 92250 FUNDUS PHOTOGRAPHY W/I&R: CPT | Performed by: OPHTHALMOLOGY

## 2024-04-30 ASSESSMENT — LID POSITION - PTOSIS
OS_PTOSIS: LLL 2+
OD_PTOSIS: RUL 1+

## 2024-04-30 ASSESSMENT — LID POSITION - ECTROPION
OS_ECTROPION: LLL T
OD_ECTROPION: RLL T

## 2024-05-02 ENCOUNTER — OFFICE (OUTPATIENT)
Dept: URBAN - METROPOLITAN AREA CLINIC 103 | Facility: CLINIC | Age: 69
Setting detail: OPHTHALMOLOGY
End: 2024-05-02
Payer: MEDICARE

## 2024-05-02 DIAGNOSIS — H01.001: ICD-10-CM

## 2024-05-02 DIAGNOSIS — H01.004: ICD-10-CM

## 2024-05-02 DIAGNOSIS — H01.002: ICD-10-CM

## 2024-05-02 DIAGNOSIS — H02.135: ICD-10-CM

## 2024-05-02 DIAGNOSIS — H01.005: ICD-10-CM

## 2024-05-02 DIAGNOSIS — H02.132: ICD-10-CM

## 2024-05-02 PROBLEM — H02.42 PTOSIS MYOGENIC; BOTH EYES: Status: ACTIVE | Noted: 2024-05-02

## 2024-05-02 PROCEDURE — 92012 INTRM OPH EXAM EST PATIENT: CPT | Performed by: OPHTHALMOLOGY

## 2024-05-02 ASSESSMENT — LID POSITION - ECTROPION
OS_ECTROPION: LLL T
OD_ECTROPION: RLL T

## 2024-05-02 ASSESSMENT — LID EXAM ASSESSMENTS
OS_BLEPHARITIS: LLL LUL 2+
OD_BLEPHARITIS: RLL RUL 2+

## 2024-05-02 ASSESSMENT — LID POSITION - PTOSIS
OS_PTOSIS: LLL 2+
OD_PTOSIS: RUL 1+

## 2024-06-14 ENCOUNTER — APPOINTMENT (OUTPATIENT)
Dept: ORTHOPEDIC SURGERY | Facility: CLINIC | Age: 69
End: 2024-06-14
Payer: MEDICARE

## 2024-06-14 VITALS — WEIGHT: 170 LBS | BODY MASS INDEX: 26.68 KG/M2 | HEIGHT: 67 IN

## 2024-06-14 DIAGNOSIS — M75.111 INCOMPLETE ROTATOR CUFF TEAR OR RUPTURE OF RIGHT SHOULDER, NOT SPECIFIED AS TRAUMATIC: ICD-10-CM

## 2024-06-14 PROCEDURE — 99213 OFFICE O/P EST LOW 20 MIN: CPT

## 2024-06-14 NOTE — IMAGING
[Right] : right shoulder [There are no fractures, subluxations or dislocations. No significant abnormalities are seen] : There are no fractures, subluxations or dislocations. No significant abnormalities are seen [Type 2 acromion] : Type 2 acromion [de-identified] : (Exam: Shoulder)   Laterality is right    Patient is in no acute distress, alert and oriented Sensation is grossly intact to light touch in the hand Motor function is 5/5 in the hand Capillary refill is less than 2 seconds in the fingers Lymphadenopathy is not present Peripheral edema is not present   Skin is intact Swelling is not present Atrophy is not present Scapular winging is not present Deformity of the AC joint is not present Deformity of the biceps is not present   Bicipital groove tenderness is present AC joint tenderness is not present Scapulothoracic tenderness is not present   Active forward elevation is 175 Passive forward elevation is 175 External rotation at the side is 60 Internal rotation behind the back is to the level of T12   Forward elevation strength is 5-/5 External rotation strength at the side is 5/5 Internal rotation strength at the side is 5/5 Deltoid strength of anterior, posterior and lateral heads is 5/5   Monterroso test is abnormal OBriens test is abnormal Empty can test is abnormal Speeds test is abnormal Cross body adduction test is normal Belly press test is normal Apprehension and relocation is normal Sulcus sign is normal

## 2024-06-14 NOTE — HISTORY OF PRESENT ILLNESS
[de-identified] : 06/14/24: Follow up on right shoulder. Patient is going to PT 1x a week with some relief. Patient reports significant improvement in strength and pain. Patient states he still has occasional pain in his shoulder.   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 Smith  Previously seen for LT shoulder which improved with PT

## 2024-06-14 NOTE — DISCUSSION/SUMMARY
[de-identified] : History, clinical examination and imaging were most consistent with: -right shoulder rotator cuff partial tear   The diagnosis was explained in detail. The potential non-surgical and surgical treatments were reviewed. The relative risks and benefits of each option were considered relative to the patients age, activity level, medical history, symptom severity and previously attempted treatments.   The patient was advised to consult with their primary medical provider prior to initiation of any new medications to reduce the risk of adverse effects specific to their long-term home medications and medical history. The risk of gastrointestinal irritation and kidney injury specific to long-term NSAID use was discussed.   -Patient is clinically improving. -Continue PT.  -Cortisone injection is discussed and deferred by the patient at this time. -Advil as needed for pain. -MRI is discussed and deferred at this time.  -Follow up in 6 weeks. If symptoms persist consider MRI vs CSI.    ([facility])   Problem Complexity -Moderate: chronic illness with exacerbation   Risk -Low: over the counter medication

## 2024-08-09 ENCOUNTER — APPOINTMENT (OUTPATIENT)
Dept: ORTHOPEDIC SURGERY | Facility: CLINIC | Age: 69
End: 2024-08-09

## 2024-08-09 PROCEDURE — 99214 OFFICE O/P EST MOD 30 MIN: CPT

## 2024-08-09 NOTE — IMAGING
[Right] : right shoulder [There are no fractures, subluxations or dislocations. No significant abnormalities are seen] : There are no fractures, subluxations or dislocations. No significant abnormalities are seen [Type 2 acromion] : Type 2 acromion [de-identified] : (Exam: Shoulder)   Laterality is right    Patient is in no acute distress, alert and oriented Sensation is grossly intact to light touch in the hand Motor function is 5/5 in the hand Capillary refill is less than 2 seconds in the fingers Lymphadenopathy is not present Peripheral edema is not present   Skin is intact Swelling is not present Atrophy is not present Scapular winging is not present Deformity of the AC joint is not present Deformity of the biceps is not present   Bicipital groove tenderness is present AC joint tenderness is not present Scapulothoracic tenderness is not present   Active forward elevation is 175 Passive forward elevation is 175 External rotation at the side is 60 Internal rotation behind the back is to the level of T12   Forward elevation strength is 5-/5 External rotation strength at the side is 5/5 Internal rotation strength at the side is 5/5 Deltoid strength of anterior, posterior and lateral heads is 5/5   Monterroso test is abnormal OBriens test is abnormal Empty can test is abnormal Speeds test is abnormal Cross body adduction test is normal Belly press test is normal Apprehension and relocation is normal Sulcus sign is normal

## 2024-08-09 NOTE — DISCUSSION/SUMMARY
[de-identified] : History, clinical examination and imaging were most consistent with: -right shoulder rotator cuff partial tear   The diagnosis was explained in detail. The potential non-surgical and surgical treatments were reviewed. The relative risks and benefits of each option were considered relative to the patients age, activity level, medical history, symptom severity and previously attempted treatments.   The patient was advised to consult with their primary medical provider prior to initiation of any new medications to reduce the risk of adverse effects specific to their long-term home medications and medical history. The risk of gastrointestinal irritation and kidney injury specific to long-term NSAID use was discussed.   -Patient has ongoing clinical improvement.  -Continue PT and HEP. -Medrol dose pack provided for symptom relief. -Cortisone injection is deferred at this time. -MRI is discussed and deferred at this time.  -Follow up in 6 weeks. If symptoms persist consider CSI vs MRI.   ([facility])   Problem Complexity -Moderate: chronic illness with exacerbation   Risk -Moderate: prescription medication

## 2024-08-09 NOTE — HISTORY OF PRESENT ILLNESS
[de-identified] : 08/09/2024: f/u for right shoulder. PT 2x a month. Symptoms are improving, mild pain. Taking Advil prn.   06/14/24: Follow up on right shoulder. Patient is going to PT 1x a week with some relief. Patient reports significant improvement in strength and pain. Patient states he still has occasional pain in his shoulder.   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 Smith  Previously seen for LT shoulder which improved with PT

## 2024-09-20 ENCOUNTER — APPOINTMENT (OUTPATIENT)
Dept: ORTHOPEDIC SURGERY | Facility: CLINIC | Age: 69
End: 2024-09-20

## 2024-09-27 ENCOUNTER — APPOINTMENT (OUTPATIENT)
Dept: ORTHOPEDIC SURGERY | Facility: CLINIC | Age: 69
End: 2024-09-27

## 2024-09-27 ENCOUNTER — APPOINTMENT (OUTPATIENT)
Dept: ORTHOPEDIC SURGERY | Facility: CLINIC | Age: 69
End: 2024-09-27
Payer: MEDICARE

## 2024-09-27 VITALS — BODY MASS INDEX: 26.68 KG/M2 | HEIGHT: 67 IN | WEIGHT: 170 LBS

## 2024-09-27 DIAGNOSIS — M51.36 OTHER INTERVERTEBRAL DISC DEGENERATION, LUMBAR REGION: ICD-10-CM

## 2024-09-27 DIAGNOSIS — M47.817 SPONDYLOSIS W/OUT MYELOPATHY OR RADICULOPATHY, LUMBOSACRAL REGION: ICD-10-CM

## 2024-09-27 DIAGNOSIS — M54.16 RADICULOPATHY, LUMBAR REGION: ICD-10-CM

## 2024-09-27 PROCEDURE — 72100 X-RAY EXAM L-S SPINE 2/3 VWS: CPT

## 2024-09-27 PROCEDURE — 99214 OFFICE O/P EST MOD 30 MIN: CPT

## 2024-09-27 RX ORDER — GABAPENTIN 300 MG/1
300 CAPSULE ORAL
Qty: 30 | Refills: 1 | Status: ACTIVE | COMMUNITY
Start: 2024-09-27 | End: 1900-01-01

## 2024-09-27 NOTE — ASSESSMENT
[FreeTextEntry1] : 70 yo male presents today for eval of his low back. XR shows DDD L3-S1, worst at L4-5 and progressed as compared to 2018. I discussed with patient further imaging for eval of nerve involvement.   - Patient given prescription for MRI, follow up after study is completed to discuss results.   - Recommend physical therapy to regain range of motion, strengthening and symptomatic improvement. Prescription given in office today.    - Patient will continue HEP as detailed in office, stretch zone and biking as tolerated. Emphasized daily stretching and strengthening.   - Recommend NSAIDs PRN - Recommend heating pad use to decrease muscle spasm - Discussed the importance of home exercises, including but not limited to hamstring stretching and core strengthening   Patient was educated on their diagnosis today. All questions answered and patient expressed understanding.   F/U after MRI

## 2024-09-27 NOTE — HISTORY OF PRESENT ILLNESS
[de-identified] : Body Part: lower back Length of symptoms/ DOI: 2024 Cause of accident/ injury: NKI Radicular symptoms: into the left hip and down the leg Paresthesia: yes, down the left leg to the knee Quality of pain: aching, sharp Pain at Rest:  4/10 Pain with activity/ walkin/10 Pain worsens with: sitting, walking Pain improves with: sleeping with pillows propped  Previous treatments: none Recent Imaging: none Current treatments: none Current medications for pain: Advil with relief Work status/ occupation: Full Time,  Assisted walking device: none

## 2024-09-27 NOTE — IMAGING
[Facet arthropathy] : Facet arthropathy [Disc space narrowing] : Disc space narrowing [FreeTextEntry1] : worse as compared to 2018

## 2024-09-28 ENCOUNTER — RESULT REVIEW (OUTPATIENT)
Age: 69
End: 2024-09-28

## 2024-10-31 ENCOUNTER — APPOINTMENT (OUTPATIENT)
Dept: ORTHOPEDIC SURGERY | Facility: CLINIC | Age: 69
End: 2024-10-31
Payer: MEDICARE

## 2024-10-31 DIAGNOSIS — M54.16 RADICULOPATHY, LUMBAR REGION: ICD-10-CM

## 2024-10-31 DIAGNOSIS — M47.817 SPONDYLOSIS W/OUT MYELOPATHY OR RADICULOPATHY, LUMBOSACRAL REGION: ICD-10-CM

## 2024-10-31 DIAGNOSIS — M48.061 SPINAL STENOSIS, LUMBAR REGION WITHOUT NEUROGENIC CLAUDICATION: ICD-10-CM

## 2024-10-31 DIAGNOSIS — M51.369: ICD-10-CM

## 2024-10-31 DIAGNOSIS — M70.62 TROCHANTERIC BURSITIS, LEFT HIP: ICD-10-CM

## 2024-10-31 PROCEDURE — 99214 OFFICE O/P EST MOD 30 MIN: CPT

## 2024-10-31 RX ORDER — DICLOFENAC SODIUM 10 MG/G
1 GEL TOPICAL 4 TIMES DAILY
Qty: 1 | Refills: 2 | Status: ACTIVE | COMMUNITY
Start: 2024-10-31 | End: 1900-01-01

## 2024-11-05 ENCOUNTER — RX ONLY (RX ONLY)
Age: 69
End: 2024-11-05

## 2024-11-05 ENCOUNTER — OFFICE (OUTPATIENT)
Dept: URBAN - METROPOLITAN AREA CLINIC 112 | Facility: CLINIC | Age: 69
Setting detail: OPHTHALMOLOGY
End: 2024-11-05
Payer: MEDICARE

## 2024-11-05 DIAGNOSIS — H16.222: ICD-10-CM

## 2024-11-05 DIAGNOSIS — H16.221: ICD-10-CM

## 2024-11-05 DIAGNOSIS — H43.393: ICD-10-CM

## 2024-11-05 DIAGNOSIS — H11.153: ICD-10-CM

## 2024-11-05 PROCEDURE — 99213 OFFICE O/P EST LOW 20 MIN: CPT | Performed by: OPHTHALMOLOGY

## 2024-11-05 ASSESSMENT — KERATOMETRY
OS_K2POWER_DIOPTERS: 45.00
OD_K2POWER_DIOPTERS: 44.50
OS_K1POWER_DIOPTERS: 44.50
OD_K1POWER_DIOPTERS: 44.50
OD_AXISANGLE_DEGREES: 169
OS_AXISANGLE_DEGREES: 076

## 2024-11-05 ASSESSMENT — REFRACTION_AUTOREFRACTION
OS_SPHERE: 0.00
OD_SPHERE: -0.25
OD_CYLINDER: 0.00
OD_AXIS: 097
OS_AXIS: 120
OS_CYLINDER: -0.50

## 2024-11-05 ASSESSMENT — REFRACTION_MANIFEST
OS_SPHERE: PLANO
OD_VA1: 20/25
OD_SPHERE: -0.50
OD_AXIS: 082
OD_CYLINDER: 0.00
OS_SPHERE: PLANO
OS_VA1: 20/20
OS_VA1: 20/20
OD_VA1: 20/20
OS_AXIS: 000
OS_CYLINDER: 0.00
OD_SPHERE: PLANO

## 2024-11-05 ASSESSMENT — SUPERFICIAL PUNCTATE KERATITIS (SPK)
OD_SPK: 2+
OS_SPK: 2+

## 2024-11-05 ASSESSMENT — TEAR BREAK UP TIME (TBUT)
OS_TBUT: 2+
OD_TBUT: 1+

## 2024-11-05 ASSESSMENT — VISUAL ACUITY
OD_BCVA: 20/25
OS_BCVA: 20/30+1

## 2024-11-05 ASSESSMENT — LID POSITION - PTOSIS
OD_PTOSIS: RUL 1+
OS_PTOSIS: LLL 2+

## 2024-11-05 ASSESSMENT — LID POSITION - ECTROPION
OS_ECTROPION: LLL T
OD_ECTROPION: RLL T

## 2024-11-05 ASSESSMENT — LID EXAM ASSESSMENTS
OD_BLEPHARITIS: RLL RUL 2+
OS_BLEPHARITIS: LLL LUL 2+

## 2024-11-05 ASSESSMENT — TONOMETRY: OD_IOP_MMHG: 10

## 2024-11-05 ASSESSMENT — CONFRONTATIONAL VISUAL FIELD TEST (CVF)
OS_FINDINGS: FULL
OD_FINDINGS: FULL

## 2025-01-30 ENCOUNTER — APPOINTMENT (OUTPATIENT)
Dept: ORTHOPEDIC SURGERY | Facility: CLINIC | Age: 70
End: 2025-01-30

## 2025-02-11 ENCOUNTER — APPOINTMENT (OUTPATIENT)
Dept: ORTHOPEDIC SURGERY | Facility: CLINIC | Age: 70
End: 2025-02-11
Payer: MEDICARE

## 2025-02-11 VITALS — WEIGHT: 17 LBS | HEIGHT: 67 IN | BODY MASS INDEX: 2.67 KG/M2

## 2025-02-11 VITALS — HEIGHT: 67 IN | BODY MASS INDEX: 26.68 KG/M2 | WEIGHT: 170 LBS

## 2025-02-11 DIAGNOSIS — S76.312A STRAIN OF MUSCLE, FASCIA AND TENDON OF THE POSTERIOR MUSCLE GROUP AT THIGH LEVEL, LEFT THIGH, INITIAL ENCOUNTER: ICD-10-CM

## 2025-02-11 DIAGNOSIS — M76.892 OTHER SPECIFIED ENTHESOPATHIES OF LEFT LOWER LIMB, EXCLUDING FOOT: ICD-10-CM

## 2025-02-11 DIAGNOSIS — Z78.9 OTHER SPECIFIED HEALTH STATUS: ICD-10-CM

## 2025-02-11 DIAGNOSIS — S76.012A STRAIN OF MUSCLE, FASCIA AND TENDON OF LEFT HIP, INITIAL ENCOUNTER: ICD-10-CM

## 2025-02-11 DIAGNOSIS — M70.62 TROCHANTERIC BURSITIS, LEFT HIP: ICD-10-CM

## 2025-02-11 PROCEDURE — 99214 OFFICE O/P EST MOD 30 MIN: CPT

## 2025-02-11 PROCEDURE — 73502 X-RAY EXAM HIP UNI 2-3 VIEWS: CPT

## 2025-02-11 RX ORDER — MELOXICAM 15 MG/1
15 TABLET ORAL
Qty: 30 | Refills: 0 | Status: ACTIVE | COMMUNITY
Start: 2025-02-11 | End: 1900-01-01

## 2025-02-14 ENCOUNTER — RESULT REVIEW (OUTPATIENT)
Age: 70
End: 2025-02-14

## 2025-02-20 ENCOUNTER — TRANSCRIPTION ENCOUNTER (OUTPATIENT)
Age: 70
End: 2025-02-20

## 2025-04-01 ENCOUNTER — APPOINTMENT (OUTPATIENT)
Dept: ORTHOPEDIC SURGERY | Facility: CLINIC | Age: 70
End: 2025-04-01

## 2025-05-22 ENCOUNTER — OFFICE (OUTPATIENT)
Dept: URBAN - METROPOLITAN AREA CLINIC 112 | Facility: CLINIC | Age: 70
Setting detail: OPHTHALMOLOGY
End: 2025-05-22
Payer: MEDICARE

## 2025-05-22 DIAGNOSIS — H02.535: ICD-10-CM

## 2025-05-22 DIAGNOSIS — H16.221: ICD-10-CM

## 2025-05-22 DIAGNOSIS — H11.153: ICD-10-CM

## 2025-05-22 DIAGNOSIS — H16.223: ICD-10-CM

## 2025-05-22 DIAGNOSIS — H02.532: ICD-10-CM

## 2025-05-22 DIAGNOSIS — H43.393: ICD-10-CM

## 2025-05-22 DIAGNOSIS — H16.222: ICD-10-CM

## 2025-05-22 PROCEDURE — 83861 MICROFLUID ANALY TEARS: CPT | Mod: QW,LT | Performed by: OPHTHALMOLOGY

## 2025-05-22 PROCEDURE — 83861 MICROFLUID ANALY TEARS: CPT | Mod: QW,RT | Performed by: OPHTHALMOLOGY

## 2025-05-22 PROCEDURE — 92014 COMPRE OPH EXAM EST PT 1/>: CPT | Mod: 25 | Performed by: OPHTHALMOLOGY

## 2025-05-22 PROCEDURE — 68761 CLOSE TEAR DUCT OPENING: CPT | Mod: 50 | Performed by: OPHTHALMOLOGY

## 2025-05-22 PROCEDURE — BRUDER EYE BRUDER EYE PADS: Performed by: OPHTHALMOLOGY

## 2025-05-22 ASSESSMENT — TONOMETRY: OD_IOP_MMHG: 10

## 2025-05-22 ASSESSMENT — VISUAL ACUITY
OS_BCVA: 20/30-1
OD_BCVA: 20/20-1

## 2025-05-22 ASSESSMENT — LID EXAM ASSESSMENTS
OD_BLEPHARITIS: RLL RUL 2+
OS_BLEPHARITIS: LLL LUL 2+

## 2025-05-22 ASSESSMENT — REFRACTION_MANIFEST
OD_VA1: 20/25
OS_SPHERE: PLANO
OS_VA1: 20/20
OD_VA1: 20/20
OD_AXIS: 082
OS_AXIS: 000
OS_VA1: 20/20
OS_CYLINDER: 0.00
OD_SPHERE: PLANO
OD_SPHERE: -0.50
OD_CYLINDER: 0.00
OS_SPHERE: PLANO

## 2025-05-22 ASSESSMENT — LID POSITION - ECTROPION
OD_ECTROPION: RLL T
OS_ECTROPION: LLL T

## 2025-05-22 ASSESSMENT — REFRACTION_AUTOREFRACTION
OS_SPHERE: +0.25
OD_SPHERE: +0.25
OS_CYLINDER: -0.25
OD_AXIS: 101
OD_CYLINDER: -0.25
OS_AXIS: 148

## 2025-05-22 ASSESSMENT — KERATOMETRY
OD_K2POWER_DIOPTERS: 44.25
OS_K2POWER_DIOPTERS: 45.00
OS_K1POWER_DIOPTERS: 44.25
OS_AXISANGLE_DEGREES: 070
OD_AXISANGLE_DEGREES: 090
OD_K1POWER_DIOPTERS: 44.25

## 2025-05-22 ASSESSMENT — TEAR BREAK UP TIME (TBUT)
OD_TBUT: 1+
OS_TBUT: 2+

## 2025-05-22 ASSESSMENT — SUPERFICIAL PUNCTATE KERATITIS (SPK)
OD_SPK: 1+ 2+
OS_SPK: 1+ 2+

## 2025-05-22 ASSESSMENT — LID POSITION - PTOSIS
OD_PTOSIS: RUL 1+
OS_PTOSIS: LLL 2+

## 2025-05-22 ASSESSMENT — CONFRONTATIONAL VISUAL FIELD TEST (CVF)
OS_FINDINGS: FULL
OD_FINDINGS: FULL

## 2025-08-06 ENCOUNTER — OFFICE (OUTPATIENT)
Dept: URBAN - METROPOLITAN AREA CLINIC 113 | Facility: CLINIC | Age: 70
Setting detail: OPHTHALMOLOGY
End: 2025-08-06
Payer: MEDICARE

## 2025-08-06 ENCOUNTER — RX ONLY (RX ONLY)
Age: 70
End: 2025-08-06

## 2025-08-06 DIAGNOSIS — H11.153: ICD-10-CM

## 2025-08-06 DIAGNOSIS — H16.222: ICD-10-CM

## 2025-08-06 DIAGNOSIS — H01.002: ICD-10-CM

## 2025-08-06 DIAGNOSIS — H16.223: ICD-10-CM

## 2025-08-06 DIAGNOSIS — H01.005: ICD-10-CM

## 2025-08-06 DIAGNOSIS — H16.221: ICD-10-CM

## 2025-08-06 DIAGNOSIS — H01.001: ICD-10-CM

## 2025-08-06 DIAGNOSIS — H01.004: ICD-10-CM

## 2025-08-06 PROCEDURE — 83861 MICROFLUID ANALY TEARS: CPT | Mod: QW,LT | Performed by: OPHTHALMOLOGY

## 2025-08-06 PROCEDURE — 83861 MICROFLUID ANALY TEARS: CPT | Mod: QW,RT | Performed by: OPHTHALMOLOGY

## 2025-08-06 PROCEDURE — 92012 INTRM OPH EXAM EST PATIENT: CPT | Performed by: OPHTHALMOLOGY

## 2025-08-06 ASSESSMENT — LID POSITION - ECTROPION
OS_ECTROPION: LLL T
OD_ECTROPION: RLL T

## 2025-08-06 ASSESSMENT — REFRACTION_AUTOREFRACTION
OD_CYLINDER: -0.75
OD_AXIS: 089
OD_SPHERE: +0.50
OS_AXIS: 178
OS_CYLINDER: -2.00
OS_SPHERE: -0.25

## 2025-08-06 ASSESSMENT — KERATOMETRY
OD_AXISANGLE_DEGREES: 177
OS_K1POWER_DIOPTERS: 45.00
OD_K2POWER_DIOPTERS: 44.25
OS_K2POWER_DIOPTERS: 46.50
OD_K1POWER_DIOPTERS: 44.00
OS_AXISANGLE_DEGREES: 090

## 2025-08-06 ASSESSMENT — LID POSITION - PTOSIS
OS_PTOSIS: LLL 2+
OD_PTOSIS: RUL 1+

## 2025-08-06 ASSESSMENT — TEAR BREAK UP TIME (TBUT)
OS_TBUT: 2+
OD_TBUT: 1+

## 2025-08-06 ASSESSMENT — REFRACTION_MANIFEST
OD_VA1: 20/20
OD_AXIS: 082
OS_VA1: 20/20
OS_AXIS: 000
OD_VA1: 20/25
OS_SPHERE: PLANO
OD_SPHERE: PLANO
OS_CYLINDER: 0.00
OS_SPHERE: PLANO
OD_SPHERE: -0.50
OD_CYLINDER: 0.00
OS_VA1: 20/20

## 2025-08-06 ASSESSMENT — CONFRONTATIONAL VISUAL FIELD TEST (CVF)
OD_FINDINGS: FULL
OS_FINDINGS: FULL

## 2025-08-06 ASSESSMENT — LID EXAM ASSESSMENTS
OS_BLEPHARITIS: LLL LUL 2+
OD_BLEPHARITIS: RLL RUL 2+

## 2025-08-06 ASSESSMENT — SUPERFICIAL PUNCTATE KERATITIS (SPK)
OD_SPK: 1+ 2+
OS_SPK: 1+ 2+

## 2025-08-06 ASSESSMENT — VISUAL ACUITY
OD_BCVA: 20/20-
OS_BCVA: 20/30-